# Patient Record
Sex: MALE | Race: BLACK OR AFRICAN AMERICAN | NOT HISPANIC OR LATINO
[De-identification: names, ages, dates, MRNs, and addresses within clinical notes are randomized per-mention and may not be internally consistent; named-entity substitution may affect disease eponyms.]

---

## 2017-07-12 PROBLEM — Z00.00 ENCOUNTER FOR PREVENTIVE HEALTH EXAMINATION: Status: ACTIVE | Noted: 2017-07-12

## 2017-07-24 ENCOUNTER — APPOINTMENT (OUTPATIENT)
Dept: OTOLARYNGOLOGY | Facility: CLINIC | Age: 57
End: 2017-07-24
Payer: COMMERCIAL

## 2017-07-24 VITALS — SYSTOLIC BLOOD PRESSURE: 174 MMHG | HEART RATE: 91 BPM | OXYGEN SATURATION: 95 % | DIASTOLIC BLOOD PRESSURE: 87 MMHG

## 2017-07-24 VITALS — HEIGHT: 72 IN | WEIGHT: 296 LBS | BODY MASS INDEX: 40.09 KG/M2

## 2017-07-24 DIAGNOSIS — Z78.9 OTHER SPECIFIED HEALTH STATUS: ICD-10-CM

## 2017-07-24 DIAGNOSIS — Z86.79 PERSONAL HISTORY OF OTHER DISEASES OF THE CIRCULATORY SYSTEM: ICD-10-CM

## 2017-07-24 DIAGNOSIS — Z82.49 FAMILY HISTORY OF ISCHEMIC HEART DISEASE AND OTHER DISEASES OF THE CIRCULATORY SYSTEM: ICD-10-CM

## 2017-07-24 DIAGNOSIS — Z83.3 FAMILY HISTORY OF DIABETES MELLITUS: ICD-10-CM

## 2017-07-24 PROCEDURE — 31575 DIAGNOSTIC LARYNGOSCOPY: CPT

## 2017-07-24 PROCEDURE — 99203 OFFICE O/P NEW LOW 30 MIN: CPT | Mod: 25

## 2017-07-24 RX ORDER — METOPROLOL TARTRATE 100 MG/1
100 TABLET, FILM COATED ORAL
Qty: 60 | Refills: 0 | Status: ACTIVE | COMMUNITY
Start: 2017-01-13

## 2017-07-24 RX ORDER — KETOROLAC TROMETHAMINE 4 MG/ML
0.4 SOLUTION/ DROPS OPHTHALMIC
Qty: 5 | Refills: 0 | Status: ACTIVE | COMMUNITY
Start: 2017-07-11

## 2017-07-24 RX ORDER — LOSARTAN POTASSIUM AND HYDROCHLOROTHIAZIDE 12.5; 5 MG/1; MG/1
50-12.5 TABLET ORAL
Qty: 30 | Refills: 0 | Status: ACTIVE | COMMUNITY
Start: 2017-01-13

## 2017-07-24 RX ORDER — GABAPENTIN 300 MG/1
300 CAPSULE ORAL
Qty: 90 | Refills: 0 | Status: ACTIVE | COMMUNITY
Start: 2016-09-16

## 2017-07-24 RX ORDER — MONTELUKAST 10 MG/1
10 TABLET, FILM COATED ORAL
Qty: 30 | Refills: 0 | Status: ACTIVE | COMMUNITY
Start: 2017-01-03

## 2017-07-24 RX ORDER — ACETAZOLAMIDE 250 MG/1
250 TABLET ORAL
Qty: 4 | Refills: 0 | Status: ACTIVE | COMMUNITY
Start: 2017-07-11

## 2017-07-24 RX ORDER — OFLOXACIN 3 MG/ML
0.3 SOLUTION/ DROPS OPHTHALMIC
Qty: 5 | Refills: 0 | Status: ACTIVE | COMMUNITY
Start: 2017-07-11

## 2017-07-24 RX ORDER — AZELASTINE HYDROCHLORIDE 0.5 MG/ML
0.05 SOLUTION/ DROPS OPHTHALMIC
Qty: 6 | Refills: 0 | Status: ACTIVE | COMMUNITY
Start: 2017-01-03

## 2017-07-24 RX ORDER — LEVOTHYROXINE SODIUM 0.17 MG/1
175 TABLET ORAL
Qty: 30 | Refills: 0 | Status: ACTIVE | COMMUNITY
Start: 2017-02-03

## 2017-07-24 RX ORDER — FLUCONAZOLE 100 MG/1
100 TABLET ORAL
Qty: 10 | Refills: 0 | Status: ACTIVE | COMMUNITY
Start: 2017-05-22

## 2017-07-24 RX ORDER — PRAVASTATIN SODIUM 40 MG/1
40 TABLET ORAL
Qty: 30 | Refills: 0 | Status: ACTIVE | COMMUNITY
Start: 2016-09-16

## 2017-07-24 RX ORDER — PREDNISOLONE ACETATE 10 MG/ML
1 SUSPENSION/ DROPS OPHTHALMIC
Qty: 5 | Refills: 0 | Status: ACTIVE | COMMUNITY
Start: 2017-07-11

## 2017-07-24 RX ORDER — FLUTICASONE PROPIONATE 50 UG/1
50 SPRAY, METERED NASAL
Qty: 16 | Refills: 0 | Status: ACTIVE | COMMUNITY
Start: 2017-01-03

## 2017-07-24 RX ORDER — IBUPROFEN 800 MG/1
800 TABLET, FILM COATED ORAL
Qty: 90 | Refills: 0 | Status: ACTIVE | COMMUNITY
Start: 2017-01-03

## 2017-07-24 RX ORDER — METFORMIN ER 500 MG 500 MG/1
500 TABLET ORAL
Qty: 60 | Refills: 0 | Status: ACTIVE | COMMUNITY
Start: 2017-06-06

## 2017-07-24 RX ORDER — LOSARTAN POTASSIUM AND HYDROCHLOROTHIAZIDE 12.5; 1 MG/1; MG/1
100-12.5 TABLET ORAL
Qty: 30 | Refills: 0 | Status: ACTIVE | COMMUNITY
Start: 2017-02-28

## 2017-08-08 ENCOUNTER — OUTPATIENT (OUTPATIENT)
Dept: OUTPATIENT SERVICES | Facility: HOSPITAL | Age: 57
LOS: 1 days | End: 2017-08-08
Payer: COMMERCIAL

## 2017-08-08 ENCOUNTER — APPOINTMENT (OUTPATIENT)
Dept: SLEEP CENTER | Facility: HOSPITAL | Age: 57
End: 2017-08-08

## 2017-08-08 DIAGNOSIS — G47.33 OBSTRUCTIVE SLEEP APNEA (ADULT) (PEDIATRIC): ICD-10-CM

## 2017-08-08 PROCEDURE — 95810 POLYSOM 6/> YRS 4/> PARAM: CPT

## 2017-08-08 PROCEDURE — 95810 POLYSOM 6/> YRS 4/> PARAM: CPT | Mod: 26

## 2017-10-31 ENCOUNTER — APPOINTMENT (OUTPATIENT)
Dept: OTOLARYNGOLOGY | Facility: CLINIC | Age: 57
End: 2017-10-31
Payer: COMMERCIAL

## 2017-10-31 VITALS — OXYGEN SATURATION: 96 % | SYSTOLIC BLOOD PRESSURE: 143 MMHG | HEART RATE: 77 BPM | DIASTOLIC BLOOD PRESSURE: 87 MMHG

## 2017-10-31 PROCEDURE — 31575 DIAGNOSTIC LARYNGOSCOPY: CPT

## 2017-10-31 PROCEDURE — 99213 OFFICE O/P EST LOW 20 MIN: CPT | Mod: 25

## 2018-01-24 ENCOUNTER — APPOINTMENT (OUTPATIENT)
Dept: OTOLARYNGOLOGY | Facility: HOSPITAL | Age: 58
End: 2018-01-24

## 2018-01-25 ENCOUNTER — APPOINTMENT (OUTPATIENT)
Dept: OTOLARYNGOLOGY | Facility: CLINIC | Age: 58
End: 2018-01-25

## 2019-09-11 PROBLEM — G47.33 OBSTRUCTIVE SLEEP APNEA (ADULT) (PEDIATRIC): Chronic | Status: ACTIVE | Noted: 2018-01-23

## 2019-09-19 ENCOUNTER — APPOINTMENT (OUTPATIENT)
Dept: OTOLARYNGOLOGY | Facility: CLINIC | Age: 59
End: 2019-09-19
Payer: COMMERCIAL

## 2019-09-19 PROCEDURE — 99214 OFFICE O/P EST MOD 30 MIN: CPT | Mod: 25

## 2019-09-19 PROCEDURE — 31575 DIAGNOSTIC LARYNGOSCOPY: CPT

## 2019-09-19 NOTE — PHYSICAL EXAM
[Normal] : tympanic membranes are normal in both ears [] : septum deviated bilaterally [FreeTextEntry1] : Obese SUNNY signs  [de-identified] : large [de-identified] : long palate

## 2019-09-19 NOTE — HISTORY OF PRESENT ILLNESS
[de-identified] : 59 years old male patient with history of Severe SUNNY to be scheduled for UPPP HS and epiglottic surgery.  Patient persistent sleep apnea.  Patient is present today in the office with persistent  Severe SUNNY current  AHI 54.  Patient C/O Snoring loud enough to disturb his sleep or that of others. Waking up gasping or choking.  Intermittent pauses in his breathing during sleep\par

## 2019-09-19 NOTE — REASON FOR VISIT
[Subsequent Evaluation] : a subsequent evaluation for [FreeTextEntry2] : Severe SUNNY to be scheduled for UPPP HS and epiglottic surgery.  Patient persistent sleep apnea

## 2019-09-19 NOTE — PROCEDURE
[Image(s) Captured] : image(s) captured and filed [Topical Lidocaine] : topical lidocaine [Oxymetazoline HCl] : oxymetazoline HCl [Flexible Endoscope] : examined with the flexible endoscope [Serial Number: ___] : Serial Number: [unfilled] [de-identified] : severe retrolingual and retropalatal obstr

## 2019-10-29 RX ORDER — METHYLPREDNISOLONE 4 MG/1
4 TABLET ORAL
Qty: 1 | Refills: 0 | Status: ACTIVE | COMMUNITY
Start: 2019-10-29 | End: 1900-01-01

## 2019-10-29 RX ORDER — LIDOCAINE HYDROCHLORIDE 20 MG/ML
2 SOLUTION ORAL; TOPICAL
Qty: 1 | Refills: 3 | Status: ACTIVE | COMMUNITY
Start: 2019-10-29 | End: 1900-01-01

## 2019-10-29 NOTE — ASU PATIENT PROFILE, ADULT - PMH
DM (diabetes mellitus)    HLD (hyperlipidemia)    HTN (hypertension)    Obesity    SUNNY (obstructive sleep apnea)

## 2019-10-30 ENCOUNTER — OUTPATIENT (OUTPATIENT)
Dept: OUTPATIENT SERVICES | Facility: HOSPITAL | Age: 59
LOS: 1 days | Discharge: HOME | End: 2019-10-30
Payer: COMMERCIAL

## 2019-10-30 ENCOUNTER — APPOINTMENT (OUTPATIENT)
Dept: OTOLARYNGOLOGY | Facility: HOSPITAL | Age: 59
End: 2019-10-30

## 2019-10-30 ENCOUNTER — RESULT REVIEW (OUTPATIENT)
Age: 59
End: 2019-10-30

## 2019-10-30 VITALS
RESPIRATION RATE: 16 BRPM | SYSTOLIC BLOOD PRESSURE: 165 MMHG | TEMPERATURE: 97 F | HEIGHT: 72 IN | DIASTOLIC BLOOD PRESSURE: 81 MMHG | OXYGEN SATURATION: 98 % | HEART RATE: 88 BPM | WEIGHT: 295.2 LBS

## 2019-10-30 DIAGNOSIS — Z98.890 OTHER SPECIFIED POSTPROCEDURAL STATES: Chronic | ICD-10-CM

## 2019-10-30 DIAGNOSIS — Z95.828 PRESENCE OF OTHER VASCULAR IMPLANTS AND GRAFTS: Chronic | ICD-10-CM

## 2019-10-30 LAB
GLUCOSE BLDC GLUCOMTR-MCNC: 158 MG/DL — HIGH (ref 70–99)
GLUCOSE BLDC GLUCOMTR-MCNC: 172 MG/DL — HIGH (ref 70–99)
GLUCOSE BLDC GLUCOMTR-MCNC: 173 MG/DL — HIGH (ref 70–99)
GLUCOSE BLDC GLUCOMTR-MCNC: 176 MG/DL — HIGH (ref 70–99)

## 2019-10-30 PROCEDURE — 21685 HYOID MYOTOMY & SUSPENSION: CPT

## 2019-10-30 PROCEDURE — S2900 ROBOTIC SURGICAL SYSTEM: CPT | Mod: NC

## 2019-10-30 PROCEDURE — 31572 LARGSC W/LASER DSTRJ LES: CPT

## 2019-10-30 PROCEDURE — 42145 REPAIR PALATE PHARYNX/UVULA: CPT

## 2019-10-30 RX ORDER — GLUCAGON INJECTION, SOLUTION 0.5 MG/.1ML
1 INJECTION, SOLUTION SUBCUTANEOUS ONCE
Refills: 0 | Status: DISCONTINUED | OUTPATIENT
Start: 2019-10-30 | End: 2019-10-31

## 2019-10-30 RX ORDER — MONTELUKAST 4 MG/1
10 TABLET, CHEWABLE ORAL DAILY
Refills: 0 | Status: DISCONTINUED | OUTPATIENT
Start: 2019-10-30 | End: 2019-10-31

## 2019-10-30 RX ORDER — INSULIN LISPRO 100/ML
VIAL (ML) SUBCUTANEOUS AT BEDTIME
Refills: 0 | Status: DISCONTINUED | OUTPATIENT
Start: 2019-10-30 | End: 2019-10-31

## 2019-10-30 RX ORDER — LOSARTAN POTASSIUM 100 MG/1
100 TABLET, FILM COATED ORAL DAILY
Refills: 0 | Status: DISCONTINUED | OUTPATIENT
Start: 2019-10-30 | End: 2019-10-31

## 2019-10-30 RX ORDER — ATORVASTATIN CALCIUM 80 MG/1
10 TABLET, FILM COATED ORAL AT BEDTIME
Refills: 0 | Status: DISCONTINUED | OUTPATIENT
Start: 2019-10-30 | End: 2019-10-31

## 2019-10-30 RX ORDER — DEXTROSE 50 % IN WATER 50 %
12.5 SYRINGE (ML) INTRAVENOUS ONCE
Refills: 0 | Status: DISCONTINUED | OUTPATIENT
Start: 2019-10-30 | End: 2019-10-31

## 2019-10-30 RX ORDER — HEPARIN SODIUM 5000 [USP'U]/ML
7500 INJECTION INTRAVENOUS; SUBCUTANEOUS EVERY 8 HOURS
Refills: 0 | Status: DISCONTINUED | OUTPATIENT
Start: 2019-10-30 | End: 2019-10-31

## 2019-10-30 RX ORDER — SODIUM CHLORIDE 9 MG/ML
1000 INJECTION, SOLUTION INTRAVENOUS
Refills: 0 | Status: DISCONTINUED | OUTPATIENT
Start: 2019-10-30 | End: 2019-10-31

## 2019-10-30 RX ORDER — DEXTROSE 50 % IN WATER 50 %
15 SYRINGE (ML) INTRAVENOUS ONCE
Refills: 0 | Status: DISCONTINUED | OUTPATIENT
Start: 2019-10-30 | End: 2019-10-31

## 2019-10-30 RX ORDER — ACETAMINOPHEN 500 MG
650 TABLET ORAL EVERY 6 HOURS
Refills: 0 | Status: DISCONTINUED | OUTPATIENT
Start: 2019-10-30 | End: 2019-10-31

## 2019-10-30 RX ORDER — METOPROLOL TARTRATE 50 MG
100 TABLET ORAL EVERY 12 HOURS
Refills: 0 | Status: DISCONTINUED | OUTPATIENT
Start: 2019-10-30 | End: 2019-10-30

## 2019-10-30 RX ORDER — CEFAZOLIN SODIUM 1 G
1000 VIAL (EA) INJECTION EVERY 8 HOURS
Refills: 0 | Status: DISCONTINUED | OUTPATIENT
Start: 2019-10-30 | End: 2019-10-30

## 2019-10-30 RX ORDER — DEXAMETHASONE 0.5 MG/5ML
10 ELIXIR ORAL EVERY 8 HOURS
Refills: 0 | Status: DISCONTINUED | OUTPATIENT
Start: 2019-10-30 | End: 2019-10-31

## 2019-10-30 RX ORDER — ONDANSETRON 8 MG/1
4 TABLET, FILM COATED ORAL ONCE
Refills: 0 | Status: DISCONTINUED | OUTPATIENT
Start: 2019-10-30 | End: 2019-10-31

## 2019-10-30 RX ORDER — CEFAZOLIN SODIUM 1 G
2000 VIAL (EA) INJECTION EVERY 8 HOURS
Refills: 0 | Status: COMPLETED | OUTPATIENT
Start: 2019-10-30 | End: 2019-10-31

## 2019-10-30 RX ORDER — BENZOCAINE AND MENTHOL 5; 1 G/100ML; G/100ML
1 LIQUID ORAL THREE TIMES A DAY
Refills: 0 | Status: DISCONTINUED | OUTPATIENT
Start: 2019-10-30 | End: 2019-10-31

## 2019-10-30 RX ORDER — OXYCODONE HYDROCHLORIDE 5 MG/1
5 TABLET ORAL EVERY 4 HOURS
Refills: 0 | Status: DISCONTINUED | OUTPATIENT
Start: 2019-10-30 | End: 2019-10-31

## 2019-10-30 RX ORDER — DEXTROSE 50 % IN WATER 50 %
25 SYRINGE (ML) INTRAVENOUS ONCE
Refills: 0 | Status: DISCONTINUED | OUTPATIENT
Start: 2019-10-30 | End: 2019-10-31

## 2019-10-30 RX ORDER — HYDROCHLOROTHIAZIDE 25 MG
12.5 TABLET ORAL DAILY
Refills: 0 | Status: DISCONTINUED | OUTPATIENT
Start: 2019-10-30 | End: 2019-10-31

## 2019-10-30 RX ORDER — LEVOTHYROXINE SODIUM 125 MCG
175 TABLET ORAL DAILY
Refills: 0 | Status: DISCONTINUED | OUTPATIENT
Start: 2019-10-30 | End: 2019-10-31

## 2019-10-30 RX ORDER — MORPHINE SULFATE 50 MG/1
2 CAPSULE, EXTENDED RELEASE ORAL
Refills: 0 | Status: DISCONTINUED | OUTPATIENT
Start: 2019-10-30 | End: 2019-10-30

## 2019-10-30 RX ORDER — METOPROLOL TARTRATE 50 MG
100 TABLET ORAL EVERY 12 HOURS
Refills: 0 | Status: DISCONTINUED | OUTPATIENT
Start: 2019-10-30 | End: 2019-10-31

## 2019-10-30 RX ORDER — METFORMIN HYDROCHLORIDE 850 MG/1
500 TABLET ORAL DAILY
Refills: 0 | Status: DISCONTINUED | OUTPATIENT
Start: 2019-10-30 | End: 2019-10-30

## 2019-10-30 RX ORDER — INSULIN LISPRO 100/ML
VIAL (ML) SUBCUTANEOUS
Refills: 0 | Status: DISCONTINUED | OUTPATIENT
Start: 2019-10-30 | End: 2019-10-31

## 2019-10-30 RX ADMIN — Medication 2000 MILLIGRAM(S): at 16:18

## 2019-10-30 RX ADMIN — Medication 2000 MILLIGRAM(S): at 23:48

## 2019-10-30 RX ADMIN — Medication 100 MILLIGRAM(S): at 17:20

## 2019-10-30 RX ADMIN — MORPHINE SULFATE 2 MILLIGRAM(S): 50 CAPSULE, EXTENDED RELEASE ORAL at 12:29

## 2019-10-30 RX ADMIN — HEPARIN SODIUM 7500 UNIT(S): 5000 INJECTION INTRAVENOUS; SUBCUTANEOUS at 14:51

## 2019-10-30 RX ADMIN — Medication 12.5 MILLIGRAM(S): at 13:13

## 2019-10-30 RX ADMIN — Medication 102 MILLIGRAM(S): at 23:48

## 2019-10-30 RX ADMIN — Medication 102 MILLIGRAM(S): at 17:58

## 2019-10-30 RX ADMIN — OXYCODONE HYDROCHLORIDE 5 MILLIGRAM(S): 5 TABLET ORAL at 17:36

## 2019-10-30 RX ADMIN — OXYCODONE HYDROCHLORIDE 5 MILLIGRAM(S): 5 TABLET ORAL at 23:00

## 2019-10-30 RX ADMIN — HEPARIN SODIUM 7500 UNIT(S): 5000 INJECTION INTRAVENOUS; SUBCUTANEOUS at 22:36

## 2019-10-30 RX ADMIN — OXYCODONE HYDROCHLORIDE 5 MILLIGRAM(S): 5 TABLET ORAL at 22:35

## 2019-10-30 RX ADMIN — ATORVASTATIN CALCIUM 10 MILLIGRAM(S): 80 TABLET, FILM COATED ORAL at 22:37

## 2019-10-30 RX ADMIN — Medication 1: at 17:00

## 2019-10-30 RX ADMIN — OXYCODONE HYDROCHLORIDE 5 MILLIGRAM(S): 5 TABLET ORAL at 17:00

## 2019-10-30 RX ADMIN — MORPHINE SULFATE 2 MILLIGRAM(S): 50 CAPSULE, EXTENDED RELEASE ORAL at 13:03

## 2019-10-30 RX ADMIN — BENZOCAINE AND MENTHOL 1 LOZENGE: 5; 1 LIQUID ORAL at 13:13

## 2019-10-30 NOTE — PROGRESS NOTE ADULT - SUBJECTIVE AND OBJECTIVE BOX
ENT Progress Note    HPI: 60yo M s/p UPPP, hyoid suspension, epiglottopexy, laryngeal dilation on 10/30    Interval: Patient seen and examined on rounds. Denies respiratory distress, chest pain, nausea/vomiting. Tolerating diet, ambulating and voiding.       PAST MEDICAL & SURGICAL HISTORY:  HLD (hyperlipidemia)  DM (diabetes mellitus)  HTN (hypertension)  Obesity  SUNNY (obstructive sleep apnea)  History of eye surgery  [place] filter in place    Allergies    No Known Allergies    Intolerances      MEDICATIONS  (STANDING):  atorvastatin 10 milliGRAM(s) Oral at bedtime  ceFAZolin  Injectable. 2000 milliGRAM(s) IV Push every 8 hours  dexAMETHasone  IVPB 10 milliGRAM(s) IV Intermittent every 8 hours  dextrose 5%. 1000 milliLiter(s) (50 mL/Hr) IV Continuous <Continuous>  dextrose 50% Injectable 12.5 Gram(s) IV Push once  dextrose 50% Injectable 25 Gram(s) IV Push once  dextrose 50% Injectable 25 Gram(s) IV Push once  heparin  Injectable 7500 Unit(s) SubCutaneous every 8 hours  hydrochlorothiazide 12.5 milliGRAM(s) Oral daily  insulin lispro (HumaLOG) corrective regimen sliding scale   SubCutaneous three times a day before meals  insulin lispro (HumaLOG) corrective regimen sliding scale   SubCutaneous at bedtime  levothyroxine 175 MICROGram(s) Oral daily  losartan 100 milliGRAM(s) Oral daily  metoprolol succinate  milliGRAM(s) Oral every 12 hours  montelukast 10 milliGRAM(s) Oral daily  ondansetron Injectable 4 milliGRAM(s) IV Push once    MEDICATIONS  (PRN):  acetaminophen    Suspension .. 650 milliGRAM(s) Oral every 6 hours PRN Temp greater or equal to 38C (100.4F), Mild Pain (1 - 3)  benzocaine 15 mG/menthol 3.6 mG Lozenge 1 Lozenge Oral three times a day PRN Sore Throat  dextrose 40% Gel 15 Gram(s) Oral once PRN Blood Glucose LESS THAN 70 milliGRAM(s)/deciliter  glucagon  Injectable 1 milliGRAM(s) IntraMuscular once PRN Glucose LESS THAN 70 milligrams/deciliter  oxyCODONE    Solution 5 milliGRAM(s) Oral every 4 hours PRN Moderate Pain (4 - 6)        Vital Signs Last 24 Hrs  T(C): 37 (30 Oct 2019 15:19), Max: 37.2 (30 Oct 2019 14:46)  T(F): 98.6 (30 Oct 2019 15:19), Max: 98.9 (30 Oct 2019 14:46)  HR: 97 (30 Oct 2019 15:19) (82 - 100)  BP: 150/90 (30 Oct 2019 15:19) (149/80 - 186/84)  BP(mean): 117 (30 Oct 2019 12:35) (90 - 128)  RR: 18 (30 Oct 2019 15:19) (11 - 24)  SpO2: 99% (30 Oct 2019 15:19) (95% - 100%)    Physical Exam:  Alert, NAD  OC/OP: clear, incision c/d/i, no bleeding or drainage   Nonlabored Respirations NO NELSON      10-30-19 @ 07:01  -  10-30-19 @ 17:34  --------------------------------------------------------  IN: 0 mL / OUT: 750 mL / NET: -750 mL    A/P: 60yo M s/p UPPP, hyoid suspension, epiglottopexy, laryngeal dilation on 10/30 progressing well postoperatively   - Continuous pulse ox overnight  - Ancef  - Dex   - Pain control  - Nausea PRN  - Soft diet  - Continue home meds  - ISS  - DVT ppx   - Likely DC in AM     d/w attending                   A/P: 59yMale      - SCDs  - d/w attending

## 2019-10-31 ENCOUNTER — TRANSCRIPTION ENCOUNTER (OUTPATIENT)
Age: 59
End: 2019-10-31

## 2019-10-31 VITALS
HEART RATE: 78 BPM | TEMPERATURE: 99 F | DIASTOLIC BLOOD PRESSURE: 91 MMHG | SYSTOLIC BLOOD PRESSURE: 149 MMHG | RESPIRATION RATE: 17 BRPM | OXYGEN SATURATION: 94 %

## 2019-10-31 LAB
EXTRA GREEN TOP TUBE: SIGNIFICANT CHANGE UP
GLUCOSE BLDC GLUCOMTR-MCNC: 181 MG/DL — HIGH (ref 70–99)
GLUCOSE BLDC GLUCOMTR-MCNC: 192 MG/DL — HIGH (ref 70–99)
HBA1C BLD-MCNC: 7.6 % — HIGH (ref 4–5.6)

## 2019-10-31 PROCEDURE — 36415 COLL VENOUS BLD VENIPUNCTURE: CPT

## 2019-10-31 PROCEDURE — C9399: CPT

## 2019-10-31 PROCEDURE — 31420 EPIGLOTTIDECTOMY: CPT

## 2019-10-31 PROCEDURE — 86850 RBC ANTIBODY SCREEN: CPT

## 2019-10-31 PROCEDURE — 42145 REPAIR PALATE PHARYNX/UVULA: CPT

## 2019-10-31 PROCEDURE — 88304 TISSUE EXAM BY PATHOLOGIST: CPT

## 2019-10-31 PROCEDURE — S2900: CPT

## 2019-10-31 PROCEDURE — 86901 BLOOD TYPING SEROLOGIC RH(D): CPT

## 2019-10-31 PROCEDURE — 88305 TISSUE EXAM BY PATHOLOGIST: CPT

## 2019-10-31 PROCEDURE — 86900 BLOOD TYPING SEROLOGIC ABO: CPT

## 2019-10-31 PROCEDURE — 83036 HEMOGLOBIN GLYCOSYLATED A1C: CPT

## 2019-10-31 PROCEDURE — 82962 GLUCOSE BLOOD TEST: CPT

## 2019-10-31 RX ORDER — LEVOTHYROXINE SODIUM 125 MCG
1 TABLET ORAL
Qty: 0 | Refills: 0 | DISCHARGE
Start: 2019-10-31

## 2019-10-31 RX ORDER — METOPROLOL TARTRATE 50 MG
1 TABLET ORAL
Qty: 0 | Refills: 0 | DISCHARGE
Start: 2019-10-31

## 2019-10-31 RX ORDER — LOSARTAN POTASSIUM 100 MG/1
1 TABLET, FILM COATED ORAL
Qty: 0 | Refills: 0 | DISCHARGE
Start: 2019-10-31

## 2019-10-31 RX ORDER — ACETAMINOPHEN 500 MG
20.31 TABLET ORAL
Qty: 0 | Refills: 0 | DISCHARGE
Start: 2019-10-31

## 2019-10-31 RX ORDER — MONTELUKAST 4 MG/1
1 TABLET, CHEWABLE ORAL
Qty: 0 | Refills: 0 | DISCHARGE
Start: 2019-10-31

## 2019-10-31 RX ADMIN — Medication 1: at 07:56

## 2019-10-31 RX ADMIN — BENZOCAINE AND MENTHOL 1 LOZENGE: 5; 1 LIQUID ORAL at 06:06

## 2019-10-31 RX ADMIN — HEPARIN SODIUM 7500 UNIT(S): 5000 INJECTION INTRAVENOUS; SUBCUTANEOUS at 05:59

## 2019-10-31 RX ADMIN — Medication 102 MILLIGRAM(S): at 07:29

## 2019-10-31 RX ADMIN — LOSARTAN POTASSIUM 100 MILLIGRAM(S): 100 TABLET, FILM COATED ORAL at 06:00

## 2019-10-31 RX ADMIN — Medication 100 MILLIGRAM(S): at 06:00

## 2019-10-31 RX ADMIN — Medication 175 MICROGRAM(S): at 06:00

## 2019-10-31 RX ADMIN — Medication 12.5 MILLIGRAM(S): at 06:00

## 2019-10-31 RX ADMIN — Medication 2000 MILLIGRAM(S): at 07:29

## 2019-10-31 NOTE — PROGRESS NOTE ADULT - SUBJECTIVE AND OBJECTIVE BOX
ENT Progress Note    HPI: 60yo M s/p UPPP, hyoid suspension, epiglottopexy, laryngeal dilation on 10/30    Interval: Patient seen and examined on rounds. Denies respiratory distress, chest pain, nausea/vomiting. Tolerating diet, ambulating and voiding. No desats overnight.       PAST MEDICAL & SURGICAL HISTORY:  HLD (hyperlipidemia)  DM (diabetes mellitus)  HTN (hypertension)  Obesity  SUNNY (obstructive sleep apnea)  History of eye surgery  [place] filter in place    Allergies    No Known Allergies    Intolerances      MEDICATIONS  (STANDING):  atorvastatin 10 milliGRAM(s) Oral at bedtime  ceFAZolin  Injectable. 2000 milliGRAM(s) IV Push every 8 hours  dexAMETHasone  IVPB 10 milliGRAM(s) IV Intermittent every 8 hours  dextrose 5%. 1000 milliLiter(s) (50 mL/Hr) IV Continuous <Continuous>  dextrose 50% Injectable 12.5 Gram(s) IV Push once  dextrose 50% Injectable 25 Gram(s) IV Push once  dextrose 50% Injectable 25 Gram(s) IV Push once  heparin  Injectable 7500 Unit(s) SubCutaneous every 8 hours  hydrochlorothiazide 12.5 milliGRAM(s) Oral daily  insulin lispro (HumaLOG) corrective regimen sliding scale   SubCutaneous three times a day before meals  insulin lispro (HumaLOG) corrective regimen sliding scale   SubCutaneous at bedtime  levothyroxine 175 MICROGram(s) Oral daily  losartan 100 milliGRAM(s) Oral daily  metoprolol succinate  milliGRAM(s) Oral every 12 hours  montelukast 10 milliGRAM(s) Oral daily  ondansetron Injectable 4 milliGRAM(s) IV Push once    MEDICATIONS  (PRN):  acetaminophen    Suspension .. 650 milliGRAM(s) Oral every 6 hours PRN Temp greater or equal to 38C (100.4F), Mild Pain (1 - 3)  benzocaine 15 mG/menthol 3.6 mG Lozenge 1 Lozenge Oral three times a day PRN Sore Throat  dextrose 40% Gel 15 Gram(s) Oral once PRN Blood Glucose LESS THAN 70 milliGRAM(s)/deciliter  glucagon  Injectable 1 milliGRAM(s) IntraMuscular once PRN Glucose LESS THAN 70 milligrams/deciliter  oxyCODONE    Solution 5 milliGRAM(s) Oral every 4 hours PRN Moderate Pain (4 - 6)        Vital Signs Last 24 Hrs  T(C): 36.3 (31 Oct 2019 05:00), Max: 37.2 (30 Oct 2019 14:46)  T(F): 97.3 (31 Oct 2019 05:00), Max: 98.9 (30 Oct 2019 14:46)  HR: 85 (31 Oct 2019 05:00) (82 - 100)  BP: 139/77 (31 Oct 2019 05:00) (139/77 - 186/84)  BP(mean): 117 (30 Oct 2019 12:35) (90 - 128)  RR: 18 (31 Oct 2019 05:00) (11 - 24)  SpO2: 95% (31 Oct 2019 05:00) (95% - 100%)    Physical Exam:  Alert, NAD  OC/OP: clear, incision c/d/i, no bleeding or drainage   Nonlabored Respirations NO NELSON      10-30-19 @ 07:01  -  10-30-19 @ 17:34  --------------------------------------------------------  IN: 0 mL / OUT: 750 mL / NET: -750 mL    A/P: 60yo M s/p UPPP, hyoid suspension, epiglottopexy, laryngeal dilation on 10/30 progressing well postoperatively   - Stable for discharge to home   - Pain control, abx, medrol dose pack   - Soft diet  - Continue home meds  - Follow up with Dr. Cervantes in 1 week     d/w attending

## 2019-10-31 NOTE — DISCHARGE NOTE PROVIDER - CARE PROVIDER_API CALL
Israel Cervantes)  Otolaryngology  [address], [address]  [place], [place] [address]  Phone: [phone]  Fax: [phone]  Follow Up Time: 1 week

## 2019-10-31 NOTE — DISCHARGE NOTE PROVIDER - NSDCMRMEDTOKEN_GEN_ALL_CORE_FT
acetaminophen 160 mg/5 mL oral suspension: 20.31 milliliter(s) orally every 6 hours, As needed, Temp greater or equal to 38C (100.4F), Mild Pain (1 - 3)  hydroCHLOROthiazide 12.5 mg oral capsule: 1 cap(s) orally once a day  levothyroxine 175 mcg (0.175 mg) oral tablet: 1 tab(s) orally once a day  losartan 100 mg oral tablet: 1 tab(s) orally once a day  metoprolol tartrate 100 mg oral tablet: 1 tab(s) orally every 12 hours  montelukast 10 mg oral tablet: 1 tab(s) orally once a day

## 2019-10-31 NOTE — DISCHARGE NOTE PROVIDER - HOSPITAL COURSE
HPI: 58yo M s/p UPPP, hyoid suspension, epiglottopexy, laryngeal dilation on 10/30        Interval: Patient seen and examined on rounds. Denies respiratory distress, chest pain, nausea/vomiting. Tolerating diet, ambulating and voiding. No desats overnight.             PAST MEDICAL & SURGICAL HISTORY:    HLD (hyperlipidemia)    DM (diabetes mellitus)    HTN (hypertension)    Obesity    SUNNY (obstructive sleep apnea)    History of eye surgery    [place] filter in place        Allergies        No Known Allergies        Intolerances            MEDICATIONS  (STANDING):    atorvastatin 10 milliGRAM(s) Oral at bedtime    ceFAZolin  Injectable. 2000 milliGRAM(s) IV Push every 8 hours    dexAMETHasone  IVPB 10 milliGRAM(s) IV Intermittent every 8 hours    dextrose 5%. 1000 milliLiter(s) (50 mL/Hr) IV Continuous <Continuous>    dextrose 50% Injectable 12.5 Gram(s) IV Push once    dextrose 50% Injectable 25 Gram(s) IV Push once    dextrose 50% Injectable 25 Gram(s) IV Push once    heparin  Injectable 7500 Unit(s) SubCutaneous every 8 hours    hydrochlorothiazide 12.5 milliGRAM(s) Oral daily    insulin lispro (HumaLOG) corrective regimen sliding scale   SubCutaneous three times a day before meals    insulin lispro (HumaLOG) corrective regimen sliding scale   SubCutaneous at bedtime    levothyroxine 175 MICROGram(s) Oral daily    losartan 100 milliGRAM(s) Oral daily    metoprolol succinate  milliGRAM(s) Oral every 12 hours    montelukast 10 milliGRAM(s) Oral daily    ondansetron Injectable 4 milliGRAM(s) IV Push once        MEDICATIONS  (PRN):    acetaminophen    Suspension .. 650 milliGRAM(s) Oral every 6 hours PRN Temp greater or equal to 38C (100.4F), Mild Pain (1 - 3)    benzocaine 15 mG/menthol 3.6 mG Lozenge 1 Lozenge Oral three times a day PRN Sore Throat    dextrose 40% Gel 15 Gram(s) Oral once PRN Blood Glucose LESS THAN 70 milliGRAM(s)/deciliter    glucagon  Injectable 1 milliGRAM(s) IntraMuscular once PRN Glucose LESS THAN 70 milligrams/deciliter    oxyCODONE    Solution 5 milliGRAM(s) Oral every 4 hours PRN Moderate Pain (4 - 6)                Vital Signs Last 24 Hrs    T(C): 36.3 (31 Oct 2019 05:00), Max: 37.2 (30 Oct 2019 14:46)    T(F): 97.3 (31 Oct 2019 05:00), Max: 98.9 (30 Oct 2019 14:46)    HR: 85 (31 Oct 2019 05:00) (82 - 100)    BP: 139/77 (31 Oct 2019 05:00) (139/77 - 186/84)    BP(mean): 117 (30 Oct 2019 12:35) (90 - 128)    RR: 18 (31 Oct 2019 05:00) (11 - 24)    SpO2: 95% (31 Oct 2019 05:00) (95% - 100%)        Physical Exam:    Alert, NAD    OC/OP: clear, incision c/d/i, no bleeding or drainage     Nonlabored Respirations NO NELSON            10-30-19 @ 07:01  -  10-30-19 @ 17:34    --------------------------------------------------------    IN: 0 mL / OUT: 750 mL / NET: -750 mL        A/P: 58yo M s/p UPPP, hyoid suspension, epiglottopexy, laryngeal dilation on 10/30 progressing well postoperatively     - Stable for discharge to home     - Pain control, abx, medrol dose pack     - Soft diet    - Continue home meds    - Follow up with Dr. Cervantes in 1 week

## 2019-10-31 NOTE — DISCHARGE NOTE PROVIDER - NSDCFUADDINST_GEN_ALL_CORE_FT
- Take all medications as prescribed: antibiotic and medrol dose pack  - You may take tylenol for mild pain. You have been prescribed stronger pain medication as needed   - Return to ED if you experience fevers, chills, nausea/vomiting, difficulty breathing, difficulty swallowing, increasing pain, neck swelling, or bleeding

## 2019-10-31 NOTE — DISCHARGE NOTE NURSING/CASE MANAGEMENT/SOCIAL WORK - PATIENT PORTAL LINK FT
You can access the FollowMyHealth Patient Portal offered by [facility] by registering at the following website: [email or web address]. By joining [employer]’s FollowMyHealth portal, you will also be able to view your health information using other applications (apps) compatible with our system.

## 2019-10-31 NOTE — DISCHARGE NOTE PROVIDER - NSDCCPCAREPLAN_GEN_ALL_CORE_FT
PRINCIPAL DISCHARGE DIAGNOSIS  Diagnosis: SUNNY (obstructive sleep apnea)  Assessment and Plan of Treatment:

## 2019-11-06 PROBLEM — E78.5 HYPERLIPIDEMIA, UNSPECIFIED: Chronic | Status: ACTIVE | Noted: 2019-10-29

## 2019-11-06 PROBLEM — I10 ESSENTIAL (PRIMARY) HYPERTENSION: Chronic | Status: ACTIVE | Noted: 2019-10-29

## 2019-11-06 PROBLEM — E66.9 OBESITY, UNSPECIFIED: Chronic | Status: ACTIVE | Noted: 2019-10-29

## 2019-11-06 PROBLEM — E11.9 TYPE 2 DIABETES MELLITUS WITHOUT COMPLICATIONS: Chronic | Status: ACTIVE | Noted: 2019-10-29

## 2019-11-08 ENCOUNTER — APPOINTMENT (OUTPATIENT)
Dept: OTOLARYNGOLOGY | Facility: CLINIC | Age: 59
End: 2019-11-08
Payer: COMMERCIAL

## 2019-11-08 VITALS
TEMPERATURE: 97.9 F | HEART RATE: 78 BPM | DIASTOLIC BLOOD PRESSURE: 82 MMHG | OXYGEN SATURATION: 99 % | SYSTOLIC BLOOD PRESSURE: 135 MMHG

## 2019-11-08 LAB — SURGICAL PATHOLOGY STUDY: SIGNIFICANT CHANGE UP

## 2019-11-08 PROCEDURE — 99024 POSTOP FOLLOW-UP VISIT: CPT

## 2019-11-08 NOTE — REVIEW OF SYSTEMS
[Patient Intake Form Reviewed] : Patient intake form was reviewed [Nasal Congestion] : nasal congestion [As Noted in HPI] : as noted in HPI [Throat Pain] : throat pain [Throat Dryness] : throat dryness [Negative] : Heme/Lymph

## 2019-11-08 NOTE — REASON FOR VISIT
[Post-Operative Visit] : a post-operative visit [FreeTextEntry2] : Status post UPPP, Hyoid suspension, Epiglottipexy

## 2019-11-08 NOTE — HISTORY OF PRESENT ILLNESS
[de-identified] : 59 years old male patient with history of Status post UPPP, Hyoid suspension, Epiglottipexy.  Patient is present today in the office for oral care.  Patient was encouraged to increase oral hydration

## 2019-11-08 NOTE — PHYSICAL EXAM
[Normal] : no rashes [de-identified] : large [FreeTextEntry1] : Obese SUNNY signs  [de-identified] : long palate

## 2020-02-04 ENCOUNTER — APPOINTMENT (OUTPATIENT)
Dept: OTOLARYNGOLOGY | Facility: CLINIC | Age: 60
End: 2020-02-04

## 2020-09-30 ENCOUNTER — APPOINTMENT (OUTPATIENT)
Dept: OTOLARYNGOLOGY | Facility: CLINIC | Age: 60
End: 2020-09-30
Payer: COMMERCIAL

## 2020-09-30 VITALS
OXYGEN SATURATION: 99 % | RESPIRATION RATE: 14 BRPM | HEART RATE: 88 BPM | TEMPERATURE: 98.1 F | DIASTOLIC BLOOD PRESSURE: 91 MMHG | SYSTOLIC BLOOD PRESSURE: 161 MMHG

## 2020-09-30 DIAGNOSIS — G47.33 OBSTRUCTIVE SLEEP APNEA (ADULT) (PEDIATRIC): ICD-10-CM

## 2020-09-30 DIAGNOSIS — G47.30 SLEEP APNEA, UNSPECIFIED: ICD-10-CM

## 2020-09-30 DIAGNOSIS — G47.19 OTHER HYPERSOMNIA: ICD-10-CM

## 2020-09-30 DIAGNOSIS — R06.83 SNORING: ICD-10-CM

## 2020-09-30 PROCEDURE — 99214 OFFICE O/P EST MOD 30 MIN: CPT

## 2020-09-30 RX ORDER — AMOXICILLIN 500 MG/1
500 CAPSULE ORAL
Qty: 14 | Refills: 0 | Status: COMPLETED | COMMUNITY
Start: 2019-10-29 | End: 2020-09-30

## 2020-09-30 RX ORDER — ACETAMINOPHEN AND CODEINE 300; 30 MG/1; MG/1
300-30 TABLET ORAL
Qty: 30 | Refills: 0 | Status: COMPLETED | OUTPATIENT
Start: 2018-01-22 | End: 2020-09-30

## 2020-09-30 RX ORDER — ACETAMINOPHEN AND CODEINE 300; 30 MG/1; MG/1
300-30 TABLET ORAL
Qty: 30 | Refills: 0 | Status: COMPLETED | COMMUNITY
Start: 2019-10-29 | End: 2020-09-30

## 2020-09-30 RX ORDER — DOCUSATE SODIUM 100 MG/1
100 CAPSULE ORAL TWICE DAILY
Qty: 1 | Refills: 0 | Status: COMPLETED | COMMUNITY
Start: 2019-10-29 | End: 2020-09-30

## 2020-09-30 NOTE — HISTORY OF PRESENT ILLNESS
[de-identified] : 59 years old male patient with history of Status post UPPP, Hyoid suspension, Epiglottipexy.  Patient is present today in the office with C/O persistent snoring and sleep disturbances for the past couple of months.  Patient will try to monitor his weight

## 2020-09-30 NOTE — REVIEW OF SYSTEMS
[Patient Intake Form Reviewed] : Patient intake form was reviewed [Nasal Congestion] : nasal congestion [As Noted in HPI] : as noted in HPI [Throat Pain] : throat pain [Throat Dryness] : throat dryness [Recent Weight Gain (___ Lbs)] : recent [unfilled] ~Ulb weight gain [Negative] : Heme/Lymph [Recent Weight Loss (___ Lbs)] : no recent weight loss

## 2020-09-30 NOTE — REASON FOR VISIT
[Subsequent Evaluation] : a subsequent evaluation for [FreeTextEntry2] : Status post UPPP, Hyoid suspension, Epiglottipexy

## 2025-03-12 NOTE — PHYSICAL EXAM
[Normal] : no rashes [FreeTextEntry1] : Obese SUNNY signs  [de-identified] : large [de-identified] : long palate Initial (On Arrival)